# Patient Record
Sex: FEMALE | Race: WHITE | URBAN - NONMETROPOLITAN AREA
[De-identification: names, ages, dates, MRNs, and addresses within clinical notes are randomized per-mention and may not be internally consistent; named-entity substitution may affect disease eponyms.]

---

## 2021-06-29 ENCOUNTER — OFFICE VISIT (OUTPATIENT)
Dept: URBAN - NONMETROPOLITAN AREA CLINIC 12 | Facility: CLINIC | Age: 66
End: 2021-06-29
Payer: COMMERCIAL

## 2021-06-29 DIAGNOSIS — H52.4 PRESBYOPIA: Primary | ICD-10-CM

## 2021-06-29 PROCEDURE — 92004 COMPRE OPH EXAM NEW PT 1/>: CPT | Performed by: OPTOMETRIST

## 2021-06-29 ASSESSMENT — INTRAOCULAR PRESSURE
OD: 19
OS: 17

## 2021-06-29 ASSESSMENT — VISUAL ACUITY
OD: 20/20
OS: 20/50

## 2021-06-29 NOTE — IMPRESSION/PLAN
Impression: Keratoconjunctivitis sicca, bilateral: Z98.410. Plan: Start ATs QID OU, Fish oil 2-3g, and warm compresses. Educated pt. on need for regular treatment and F/U.

## 2021-06-29 NOTE — IMPRESSION/PLAN
Impression: Combined forms of age-related cataract, bilateral: H25.813. Plan: RTC 2 months for [facility] and possible CE referral OS.

## 2021-09-09 ENCOUNTER — OFFICE VISIT (OUTPATIENT)
Dept: URBAN - NONMETROPOLITAN AREA CLINIC 14 | Facility: CLINIC | Age: 66
End: 2021-09-09
Payer: MEDICARE

## 2021-09-09 DIAGNOSIS — H16.223 KERATOCONJUNCTIVITIS SICCA, BILATERAL: Primary | ICD-10-CM

## 2021-09-09 PROCEDURE — 99213 OFFICE O/P EST LOW 20 MIN: CPT | Performed by: OPTOMETRIST

## 2021-09-09 PROCEDURE — 92134 CPTRZ OPH DX IMG PST SGM RTA: CPT | Performed by: OPTOMETRIST

## 2021-09-09 ASSESSMENT — INTRAOCULAR PRESSURE
OS: 20
OD: 18

## 2021-09-09 NOTE — IMPRESSION/PLAN
Impression: Combined forms of age-related cataract, bilateral: H25.813. Plan: Pt. not ready for referral at this time. RTC 6 months for F/U.

## 2021-09-09 NOTE — IMPRESSION/PLAN
Impression: Keratoconjunctivitis sicca, bilateral: D92.070. Plan: Cont ATs QID OU, Fish oil 2-3g, and warm compresses. Educated pt. on need for regular treatment and F/U.

## 2022-03-23 ENCOUNTER — OFFICE VISIT (OUTPATIENT)
Dept: URBAN - NONMETROPOLITAN AREA CLINIC 14 | Facility: CLINIC | Age: 67
End: 2022-03-23
Payer: MEDICARE

## 2022-03-23 DIAGNOSIS — H43.813 VITREOUS DEGENERATION, BILATERAL: ICD-10-CM

## 2022-03-23 DIAGNOSIS — H25.813 COMBINED FORMS OF AGE-RELATED CATARACT, BILATERAL: Primary | ICD-10-CM

## 2022-03-23 PROCEDURE — 92134 CPTRZ OPH DX IMG PST SGM RTA: CPT | Performed by: OPTOMETRIST

## 2022-03-23 PROCEDURE — 99214 OFFICE O/P EST MOD 30 MIN: CPT | Performed by: OPTOMETRIST

## 2022-03-23 ASSESSMENT — VISUAL ACUITY
OD: 20/30
OS: 20/40

## 2022-03-23 ASSESSMENT — INTRAOCULAR PRESSURE
OD: 18
OS: 16

## 2022-03-23 NOTE — IMPRESSION/PLAN
Impression: Vitreous degeneration, bilateral: H43.813. Plan: OCT shows partial PVD OU. Monitor at this time. Educated pt. on S/S of RD and need to RTC STAT with flashes, new floaters, or loss of vision.

## 2022-03-23 NOTE — IMPRESSION/PLAN
Impression: Keratoconjunctivitis sicca, bilateral: L71.291. Plan: Cont ATs QID OU, Fish oil 2-3g, and warm compresses. Educated pt. on need for regular treatment and F/U.

## 2022-03-23 NOTE — IMPRESSION/PLAN
Impression: Combined forms of age-related cataract, bilateral: H25.813. Plan: Refer for CE OU. Educated pt. on risks/benefits of CE. Pt. elects to proceed with CE at this time.

## 2022-04-19 ENCOUNTER — PRE-OPERATIVE VISIT (OUTPATIENT)
Dept: URBAN - NONMETROPOLITAN AREA CLINIC 13 | Facility: CLINIC | Age: 67
End: 2022-04-19
Payer: MEDICARE

## 2022-04-19 DIAGNOSIS — H43.813 VITREOUS DEGENERATION, BILATERAL: ICD-10-CM

## 2022-04-19 DIAGNOSIS — H25.813 COMBINED FORMS OF AGE-RELATED CATARACT, BILATERAL: Primary | ICD-10-CM

## 2022-04-19 DIAGNOSIS — H25.811 COMBINED FORMS OF AGE-RELATED CATARACT, RIGHT EYE: ICD-10-CM

## 2022-04-19 DIAGNOSIS — H16.223 KERATOCONJUNCTIVITIS SICCA, BILATERAL: ICD-10-CM

## 2022-04-19 PROCEDURE — 99204 OFFICE O/P NEW MOD 45 MIN: CPT | Performed by: OPHTHALMOLOGY

## 2022-04-19 ASSESSMENT — VISUAL ACUITY
OD: 20/30
OS: 20/40

## 2022-04-19 ASSESSMENT — INTRAOCULAR PRESSURE
OS: 17
OD: 18

## 2022-04-19 NOTE — IMPRESSION/PLAN
Impression: Combined forms of age-related cataract, bilateral: H25.813. Plan: Discussed diagnosis in detail with patient. Discussed treatment options with patient. Patient elects to have surgery. Surgical risks and benefits were discussed, explained and understood by patient. OS then OD std iol aim plano OD  plano OS ok dexycu.   order ascan/optical biometry for iol calculation pos 4 line rk ou

## 2022-05-23 ENCOUNTER — ADULT PHYSICAL (OUTPATIENT)
Dept: URBAN - NONMETROPOLITAN AREA CLINIC 13 | Facility: CLINIC | Age: 67
End: 2022-05-23
Payer: MEDICARE

## 2022-05-23 DIAGNOSIS — Z01.818 ENCOUNTER FOR OTHER PREPROCEDURAL EXAMINATION: Primary | ICD-10-CM

## 2022-05-23 DIAGNOSIS — H25.813 COMBINED FORMS OF AGE-RELATED CATARACT, BILATERAL: Primary | ICD-10-CM

## 2022-05-23 PROCEDURE — 99203 OFFICE O/P NEW LOW 30 MIN: CPT | Performed by: PHYSICIAN ASSISTANT

## 2022-06-14 ENCOUNTER — POST-OPERATIVE VISIT (OUTPATIENT)
Dept: URBAN - NONMETROPOLITAN AREA CLINIC 14 | Facility: CLINIC | Age: 67
End: 2022-06-14
Payer: MEDICARE

## 2022-06-14 DIAGNOSIS — Z48.810 ENCOUNTER FOR SURGICAL AFTERCARE FOLLOWING SURGERY ON A SENSE ORGAN: Primary | ICD-10-CM

## 2022-06-14 PROCEDURE — 99024 POSTOP FOLLOW-UP VISIT: CPT | Performed by: OPTOMETRIST

## 2022-06-14 ASSESSMENT — INTRAOCULAR PRESSURE
OS: 30
OD: 19

## 2022-06-14 NOTE — IMPRESSION/PLAN
Impression: S/P Cataract Extraction by phacoemulsification with IOL placement OS - 1 Day. Encounter for surgical aftercare following surgery on a sense organ  Z48.810. Plan: Pt doing well. Start timolol BID OS. RTC 1 week for F/U or sooner with changes.

## 2022-06-21 ENCOUNTER — POST-OPERATIVE VISIT (OUTPATIENT)
Dept: URBAN - NONMETROPOLITAN AREA CLINIC 14 | Facility: CLINIC | Age: 67
End: 2022-06-21
Payer: MEDICARE

## 2022-06-21 DIAGNOSIS — Z48.810 ENCOUNTER FOR SURGICAL AFTERCARE FOLLOWING SURGERY ON A SENSE ORGAN: Primary | ICD-10-CM

## 2022-06-21 PROCEDURE — 99024 POSTOP FOLLOW-UP VISIT: CPT | Performed by: OPTOMETRIST

## 2022-06-21 ASSESSMENT — VISUAL ACUITY: OS: 20/40

## 2022-06-21 ASSESSMENT — INTRAOCULAR PRESSURE
OD: 18
OS: 14